# Patient Record
Sex: FEMALE | Race: BLACK OR AFRICAN AMERICAN
[De-identification: names, ages, dates, MRNs, and addresses within clinical notes are randomized per-mention and may not be internally consistent; named-entity substitution may affect disease eponyms.]

---

## 2019-07-03 ENCOUNTER — HOSPITAL ENCOUNTER (EMERGENCY)
Dept: HOSPITAL 62 - ER | Age: 64
Discharge: HOME | End: 2019-07-03
Payer: MEDICARE

## 2019-07-03 VITALS — SYSTOLIC BLOOD PRESSURE: 148 MMHG | DIASTOLIC BLOOD PRESSURE: 73 MMHG

## 2019-07-03 DIAGNOSIS — R06.02: Primary | ICD-10-CM

## 2019-07-03 DIAGNOSIS — I10: ICD-10-CM

## 2019-07-03 DIAGNOSIS — R42: ICD-10-CM

## 2019-07-03 LAB
ADD MANUAL DIFF: NO
ALBUMIN SERPL-MCNC: 4.5 G/DL (ref 3.5–5)
ALP SERPL-CCNC: 59 U/L (ref 38–126)
ALT SERPL-CCNC: 33 U/L (ref 9–52)
ANION GAP SERPL CALC-SCNC: 9 MMOL/L (ref 5–19)
APPEARANCE UR: CLEAR
APTT PPP: (no result) S
AST SERPL-CCNC: 47 U/L (ref 14–36)
BASOPHILS # BLD AUTO: 0 10^3/UL (ref 0–0.2)
BASOPHILS NFR BLD AUTO: 0.6 % (ref 0–2)
BILIRUB DIRECT SERPL-MCNC: 0.3 MG/DL (ref 0–0.4)
BILIRUB SERPL-MCNC: 0.5 MG/DL (ref 0.2–1.3)
BILIRUB UR QL STRIP: NEGATIVE
BUN SERPL-MCNC: 7 MG/DL (ref 7–20)
CALCIUM: 10 MG/DL (ref 8.4–10.2)
CHLORIDE SERPL-SCNC: 106 MMOL/L (ref 98–107)
CO2 SERPL-SCNC: 28 MMOL/L (ref 22–30)
EOSINOPHIL # BLD AUTO: 0 10^3/UL (ref 0–0.6)
EOSINOPHIL NFR BLD AUTO: 0.6 % (ref 0–6)
ERYTHROCYTE [DISTWIDTH] IN BLOOD BY AUTOMATED COUNT: 13.9 % (ref 11.5–14)
GLUCOSE SERPL-MCNC: 151 MG/DL (ref 75–110)
GLUCOSE UR STRIP-MCNC: NEGATIVE MG/DL
HCT VFR BLD CALC: 43.8 % (ref 36–47)
HGB BLD-MCNC: 14.4 G/DL (ref 12–15.5)
KETONES UR STRIP-MCNC: (no result) MG/DL
LYMPHOCYTES # BLD AUTO: 1.6 10^3/UL (ref 0.5–4.7)
LYMPHOCYTES NFR BLD AUTO: 22.3 % (ref 13–45)
MCH RBC QN AUTO: 27.7 PG (ref 27–33.4)
MCHC RBC AUTO-ENTMCNC: 32.8 G/DL (ref 32–36)
MCV RBC AUTO: 85 FL (ref 80–97)
MONOCYTES # BLD AUTO: 0.5 10^3/UL (ref 0.1–1.4)
MONOCYTES NFR BLD AUTO: 6.4 % (ref 3–13)
NEUTROPHILS # BLD AUTO: 5.1 10^3/UL (ref 1.7–8.2)
NEUTS SEG NFR BLD AUTO: 70.1 % (ref 42–78)
NITRITE UR QL STRIP: NEGATIVE
PH UR STRIP: 9 [PH] (ref 5–9)
PLATELET # BLD: 183 10^3/UL (ref 150–450)
POTASSIUM SERPL-SCNC: 4.2 MMOL/L (ref 3.6–5)
PROT SERPL-MCNC: 7.8 G/DL (ref 6.3–8.2)
PROT UR STRIP-MCNC: NEGATIVE MG/DL
RBC # BLD AUTO: 5.19 10^6/UL (ref 3.72–5.28)
SODIUM SERPL-SCNC: 142.9 MMOL/L (ref 137–145)
SP GR UR STRIP: 1.01
TOTAL CELLS COUNTED % (AUTO): 100 %
UROBILINOGEN UR-MCNC: NEGATIVE MG/DL (ref ?–2)
WBC # BLD AUTO: 7.2 10^3/UL (ref 4–10.5)

## 2019-07-03 PROCEDURE — 96361 HYDRATE IV INFUSION ADD-ON: CPT

## 2019-07-03 PROCEDURE — 80053 COMPREHEN METABOLIC PANEL: CPT

## 2019-07-03 PROCEDURE — 82962 GLUCOSE BLOOD TEST: CPT

## 2019-07-03 PROCEDURE — 36415 COLL VENOUS BLD VENIPUNCTURE: CPT

## 2019-07-03 PROCEDURE — 93005 ELECTROCARDIOGRAM TRACING: CPT

## 2019-07-03 PROCEDURE — 78582 LUNG VENTILAT&PERFUS IMAGING: CPT

## 2019-07-03 PROCEDURE — A9540 TC99M MAA: HCPCS

## 2019-07-03 PROCEDURE — 96374 THER/PROPH/DIAG INJ IV PUSH: CPT

## 2019-07-03 PROCEDURE — 70450 CT HEAD/BRAIN W/O DYE: CPT

## 2019-07-03 PROCEDURE — A9567 TECHNETIUM TC-99M AEROSOL: HCPCS

## 2019-07-03 PROCEDURE — 93010 ELECTROCARDIOGRAM REPORT: CPT

## 2019-07-03 PROCEDURE — 85379 FIBRIN DEGRADATION QUANT: CPT

## 2019-07-03 PROCEDURE — 99285 EMERGENCY DEPT VISIT HI MDM: CPT

## 2019-07-03 PROCEDURE — 84484 ASSAY OF TROPONIN QUANT: CPT

## 2019-07-03 PROCEDURE — 71045 X-RAY EXAM CHEST 1 VIEW: CPT

## 2019-07-03 PROCEDURE — 85025 COMPLETE CBC W/AUTO DIFF WBC: CPT

## 2019-07-03 PROCEDURE — 81001 URINALYSIS AUTO W/SCOPE: CPT

## 2019-07-03 NOTE — RADIOLOGY REPORT (SQ)
EXAM DESCRIPTION: 



CT HEAD WITHOUT IV CONTRAST



COMPLETED DATE/TME:  07/03/2019 03:52



CLINICAL HISTORY: 



63 years, Female, ALTERED MENTAL STATUS



COMPARISON:

None.



TECHNIQUE:

Axial CT images of the brain were obtained without contrast.

Sagittal and coronal reformats were performed. [facility] [id number]  Images

stored on PACS.

 

All CT scanners at this facility use dose modulation, iterative

reconstruction, and/or weight based dosing when appropriate to

reduce radiation dose to as low as reasonably achievable (ALARA).





[facility]: Dose Right [facility]: CareDose   [facility]: Dose Right    [facility]:

Teradose 4D    [facility]: Smart Technologies



LIMITATIONS:

None.



FINDINGS:



There is no acute cortical infarct, hemorrhage, mass, edema,

hydrocephalus, or extra-axial fluid collection. The gray-white

matter differentiation is preserved. There are periventricular

and deep white matter chronic microvascular changes. The

paranasal sinuses and mastoid air cells are clear. There is no

acute fracture.





IMPRESSION:



No acute intracranial abnormality.

 

TECHNICAL DOCUMENTATION:



Quality ID # 436: Final reports with documentation of one or more

dose reduction techniques (e.g., Automated exposure control,

adjustment of the mA and/or kV according to patient size, use of

iterative reconstruction technique)



copyright 2011 [employer]- All Rights Reserved

## 2019-07-03 NOTE — RADIOLOGY REPORT (SQ)
EXAM DESCRIPTION:  NM LUNG VENT/PERF SCAN



COMPLETED DATE/TIME:  7/3/2019 10:13 am



REASON FOR STUDY:  dyspnea, elevated d dimer



COMPARISON:  AP chest 7/3/2019



RADIONUCLIDE AND DOSE:  5.4 millicuries TC-99m MAA  Intravenous

31.9 millicuries TC-99m DTPA Inhaled aerosol



TECHNIQUE:  Eight views of the lungs acquired post ventilation of DTPA aerosol.  Eight matching views
 of the lungs acquired following injection of MAA.



LIMITATIONS:  None.



FINDINGS:  VENTILATION: Symmetric and homogeneous distribution of DTPA aerosol during ventilatory pha
se.  No significant areas of photopenia.

PERFUSION: Perfusion images with normal homogenous activity and no wedge-shaped or segmental defects.
  No ventilation-perfusion mismatches.

OTHER: No other significant finding.



IMPRESSION:  NORMAL VENTILATION-PERFUSION LUNG SCAN.  NEGATIVE FOR PULMONARY EMBOLI.



TECHNICAL DOCUMENTATION:  JOB ID:  [id number]

 2011 [employer]- All Rights Reserved



Reading location - IP/workstation name: [email or web address]

## 2019-07-03 NOTE — RADIOLOGY REPORT (SQ)
EXAM DESCRIPTION: 



XR CHEST 1 VIEW



COMPLETED DATE/TME:  07/03/2019 03:50



CLINICAL HISTORY: 



63 years, Female, DYSPNEA



COMPARISON:

None.



NUMBER OF VIEWS:

One



TECHNIQUE:

AP view the chest



LIMITATIONS:

None.



FINDINGS:



The lungs are clear. The heart is normal in size. There is no

pneumothorax or pleural effusion. There is no acute fracture.



IMPRESSION:



No acute cardiopulmonary abnormality.

 



copyright 2011 [employer]- All Rights Reserved 15.4

## 2020-12-02 ENCOUNTER — HOSPITAL ENCOUNTER (OUTPATIENT)
Dept: HOSPITAL 62 - RDC | Age: 65
End: 2020-12-02
Attending: NURSE PRACTITIONER
Payer: MEDICARE

## 2020-12-02 VITALS — SYSTOLIC BLOOD PRESSURE: 165 MMHG | DIASTOLIC BLOOD PRESSURE: 80 MMHG

## 2020-12-02 DIAGNOSIS — Z20.828: Primary | ICD-10-CM

## 2020-12-02 DIAGNOSIS — J45.909: ICD-10-CM

## 2020-12-02 DIAGNOSIS — Z91.041: ICD-10-CM

## 2020-12-02 DIAGNOSIS — I10: ICD-10-CM

## 2020-12-02 PROCEDURE — 99211 OFF/OP EST MAY X REQ PHY/QHP: CPT

## 2020-12-02 PROCEDURE — C9803 HOPD COVID-19 SPEC COLLECT: HCPCS

## 2020-12-02 PROCEDURE — 87635 SARS-COV-2 COVID-19 AMP PRB: CPT

## 2020-12-02 PROCEDURE — 99201: CPT

## 2020-12-02 NOTE — ER RDC ASSESSMENT REPORT
Intake





- In the Last 14 days


Have you traveled outside [place]?: No


Have you been in close contact with someone CONFIRMED: Yes


Worked in Healthcare?: No





- Symptoms


Subjective Fever(Felt feverish): No


Chills: No


Muscule Aches: No


Runny Nose: No


Sore Throat: No


Cough (New or worsening chronic cough): No


Shortness of breath: No


Nausea or Vomiting: No


Headache: No


Abdominal Pain: No


Diarrhea(3 or more loose stools in last 24 hours): No





- Do you have any of the following


Chronic lung disease: Asthma or emphysema or COPD: Yes


Chronic Lung Disease Comment: 





History of asthma


Cystic Fibrosis: No


Diabetes: No


High Blood Pressure: No


Cardiovascular Disease: No


Chronic Kidney Disease: No


Chronic Liver Disease: No


Chronic blood disorder like Sickle Cell Disease: No


Weak immune system due to disease or medication: No


Neurologic condition that limits movement: No


Developmental delay - Moderate to Severe: No


Recent (within past 2 weeks) or current Pregnancy: No


Morbid Obesity (>100 pounds over ideal weight): No


Obesity Comment: 





Weight 183 pounds height 5 feet 4 inches





- Objective


Temperature: 98.6 F


Pulse Rate: 85


Respiratory Rate: 16


Blood Pressure: 165/80


O2 Sat by Pulse Oximetry: 95


Objective: 


Given above, testing performed: 
































If Testing Performed:


Test Specimen Type Sent to











General





- General


Information source: Patient


Notes: 





Patient here at [facility] for Covid testing.  Patient states has exposure to daughter 

who tested +2 days ago.  Was in contact with her last week daughter dropped off 

package patient wore mask.  Patient denies any symptoms.  Patient's PCP is Dr. Hess and recommended testing for Covid.





- Related Data


Allergies/Adverse Reactions: 


                                        





Iodinated Contrast Media [Iodinated Contrast- Oral and IV Dye] Allergy (Verified

07/03/19 07:41)


   











Past Medical History





- General


Information source: Patient





- Social History


Smoking Status: Never Smoker


Family History: CAD





- Past Medical History


Cardiac Medical History: Reports: Hx Hypertension


Renal/ Medical History: Denies: Hx Peritoneal Dialysis


Past Surgical History: Reports: Hx Oral Surgery





Physical Exam





- General


General appearance: Appears well, Alert


In distress: None


Notes: 





PHYSICAL EXAMINATION: 


GENERAL: Well-appearing and in no acute distress. 


HEAD: Atraumatic, normocephalic. 


EYES: sclera anicteric, conjunctiva are normal. 


ENT: nares patent. Moist mucous membranes. 


NECK: Normal range of motion, supple without lymphadenopathy 


LUNGS: CTAB and equal. No wheezes rales or rhonchi.  Respirations even and u

nlabored lung sounds clear.


HEART: Regular rate and rhythm without murmurs 


ABDOMEN: Soft, nontender, normal bowel sounds, no guarding. 


EXTREMITIES: Normal range of motion, no pitting edema. No cyanosis. 


NEUROLOGICAL: Cranial nerves grossly intact. Normal speech. Normal gait.


PSYCH: Normal mood, normal affect. 


SKIN: Warm, Dry, normal turgor, no rashes or lesions noted








Diagnostic Results


Laboratory Results: 


Pending Covid testing results.  Patient provided instructions regarding Covid to

include: As a person under investigation for Covid 19, the [place] 

department of Health and Human Services, division of public health advises you 

to adhere to the following guidance until your test results are reported to you.

 If your test result is positive, you will receive additional information from 

your provider and your local health department at that time.


 


Remain at home until you are cleared by the health provider or public health 

authorities.


 


Keep a log of visitors to your home, notify any visitors to your home of your 

isolation status.


 


If you plan to move to a new address or leave the [facility], notify the local 

health department in your County.


 


Call your doctor or seek care if you have an urgent medical need.  Before 

seeking medical care, call ahead to get instructions from the provider before 

arriving at the medical office clinic or hospital.  Notify them that you are 

being tested for the virus that causes Covid 19 so that arrangements can be 

made, as necessary, to prevent transmission to others in the healthcare setting.

 Next, notify the local health department in your county.


 


If a medical emergency arises and you need to call 911, inform the first 

responders that you are being tested for the virus that causes Covid 19.  Next, 

notify the local health department in your county.





Patient Education/Counseling


Counseling/Education: 





Patient presents with upper respiratory symptoms worrisome for possible Covid 

19.  Patient does not have emergency worring symptoms such as difficulty 

breathing, shortness of breath, chest pain, pressure, confusion or cyanosis.  

Patient appears suitable for discharge.  Patient instructed to follow-up with 

PCP Dr. Hess.  Patient's vital signs are stable and patient is nontoxic in 

appearance.  Good return precautions have been discussed with patient, patient 

verbalized understanding and is agreeable with discharge plan of care at this 

time.





RDC Discharge





- Discharge


Clinical Impression: 


 Encounter for screening laboratory testing for COVID-19 virus in asymptomatic 

patient





Condition: Stable


Disposition: Home; Selfcare

## 2023-01-31 NOTE — ER DOCUMENT REPORT
Advised regular use of [place] grid. ED General





<NIEVES PASTORN C - Last Filed: 19 05:23>





<CELESTE PASTOR A - Last Filed: 19 10:54>





- General


Chief Complaint: Shortness Of Breath


Stated Complaint: DIFFICULTY BREATHING


Time Seen by Provider: 19 03:49


Notes: 





Patient is a 63-year-old female with history of hypertension that presents to 

the emergency department for chief complaint of shortness of breath.  Patient 

apparently woke up feeling short of breath, she did have outpatient dental 

surgery earlier today with anesthesia, was discharged home with oxycodone, 

ibuprofen and penicillin.  She has been having episodes where she is feeling 

dizzy, and having brief episodes of feeling short of breath when she is 

breathing heavy and that is of brought her to the emergency department.  She 

denies having any chest pain associated with this, denies fevers, chills, night 

sweats, nausea, vomiting, abdominal pain, dysuria hematuria.  She states she had

2 molars extracted in her upper right mouth.





Past Medical History: Hypertension


Past Surgical History: 


Social History: Denies tobacco, alcohol or drug use.


Family History: Reviewed and noncontributory for presenting illness


Allergies: Reviewed, see documented allergy list. 





REVIEW OF SYSTEMS:


Other than noted above, the 12 point review of systems was reviewed with the 

patient and were negative, all pertinent findings are included in the HPI.





PHYSICAL EXAMINATION:





Vital signs reviewed, nursing noted reviewed. 





GENERAL: Patient appears, anxious on exam with somewhat of a bizarre affect





HEAD: Atraumatic, normocephalic.





EYES: Eyes appear normal, extraocular movements intact, sclera anicteric, 

conjunctiva are normal.  PERRLA, but pupils are constricted





ENT: nares patent, oropharynx clear without exudates.  Moist mucous membranes.  

The patient's upper right posterior molars, have been extracted, and no active 

bleeding, appear to be healing well from recent extraction.





NECK: Normal range of motion, supple without lymphadenopathy





LUNGS: Rapid shallow breathing, however lungs are clear to auscultation 

bilaterally.





HEART: Heart rate mildly bradycardic, regular rhythm, no audible murmur.





ABDOMEN: Soft, nontender, normoactive bowel sounds.  No rebound, guarding, or 

rigidity. No masses appreciated.





EXTREMITIES: Nontender, good range of motion, no pitting or edema.  





NEUROLOGICAL: No focal neurological deficits. Moves all extremities 

spontaneously Motor and sensory grossly intact on exam.





PSYCH: Bizarre affect, appears mildly anxious





SKIN: Warm, Dry, normal turgor, no rashes or lesions noted on exposed skin


 (SILVANO PASTOR)





- Related Data


Allergies/Adverse Reactions: 


                                        





Iodinated Contrast- Oral and IV Dye Allergy (Verified 19 07:41)


   











Past Medical History





- Social History


Smoking Status: Never Smoker


Family History: CAD





<CELESTE PASTOR - Last Filed: 19 10:54>





- Vital signs


Vitals: 





                                        











Resp


 


 13 


 


 19 03:42














Course





- Laboratory


Result Diagrams: 


                                 19 03:45





                                 19 03:45





<SILVANO PASTOR - Last Filed: 19 05:23>





- Laboratory


Result Diagrams: 


                                 19 03:45





                                 19 03:45





<CELESTE PASTOR - Last Filed: 19 10:54>





- Re-evaluation


Re-evalutation: 





Patient seen and examined vital signs reviewed. 





Laboratory data and/or imaging were ordered as appropriate for the patient's 

presenting symptoms and complaint, with consideration of any critical or life 

threatening conditions that may be associated with their obtained history and 

exam as noted above.





Patient was treated with IV Narcan 0.4 mg, given she is recently started on 

oxycodone, she had a total of 3 tablets this evening, patient did have an i

ncrease in her heart rate, but was still having intermittently rapid breathing.





Results were reviewed when available and demonstrated unremarkable work-up, 

negative CT of the head, negative CBC, unremarkable CMP, negative troponin, EKG 

demonstrated sinus rhythm without concerning for ischemia, chest x-ray negative 

as well.  I did order a d-dimer, that was slightly elevated, patient has an 

allergy to IVP dye, therefore VQ scan was ordered, I do have a low suspicion for

PE, given the patient just had surgery, but given the patient's having 

intermittent rapid breathing and dyspnea, did want to rule this out and 

therefore will pursue the VQ scan.





The patient was re-evaluated and was still appearing rather anxious, I advised 

her to to try to control her breathing, by breathing through nose and out her 

mouth, I did offer to give her a low-dose of IV Ativan, but she wanted to try to

slow her breathing on her own.  She did appear anxious, and I think she may be 

having a panic attack versus side effect from anesthesia/oxycodone.





At this point, I will sign out the VQ scan to my colleague, Dr. CATRACHITA Pastor, to 

follow-up on that and to review the results with the patient.





*Note is created using voice recognition software and may contain spelling, 

syntax or grammatical errors.


 (SILVANO PASTOR)





19 10:45


Patient seen and evaluated by myself.  She has normal respirations.  She is in 

no acute distress.  She states she is feeling much better.  Her VQ scan is 

normal.  At this point I agree that her symptoms may have been a side effect 

from the pain medication and anesthesia.  She was advised to take Tylenol and 

ibuprofen instead of the Percocet.  Currently she states she does not have any 

postoperative pain.  She was advised to follow with a primary care provider in 

the area.  She will return for new or worsening symptoms.  She is stable and in 

agreement with plan of care at discharge. (CELESTE PASTOR)





- Vital Signs


Vital signs: 





                                        











Temp Pulse Resp BP Pulse Ox


 


 97.4 F      12   125/71   97 


 


 19 03:45     19 09:17  19 09:17  19 09:17














- Laboratory


Laboratory results interpreted by me: 





                                        











  19





  03:42 03:45 03:45


 


D-Dimer    0.59 H


 


Glucose   151 H 


 


POC Glucose  145 H  


 


AST   47 H 


 


Urine Ketones   














  19





  06:38


 


D-Dimer 


 


Glucose 


 


POC Glucose 


 


AST 


 


Urine Ketones  TRACE H














Discharge





<SILVANO PASTOR - Last Filed: 19 05:23>





<CELESTE PASTOR - Last Filed: 19 10:54>





- Discharge


Clinical Impression: 


 Shortness of breath





Condition: Stable


Disposition: HOME, SELF-CARE


Instructions:  Dyspnea, Nonspecific ([facility]), Anxiety ([facility])


Additional Instructions: 


Please return to the emergency department if you have any worsening, or concern 

of your symptoms.





Please return to the emergency department if you develop chest pain, difficulty 

breathing, severe abdominal pain, or ongoing vomiting.





Please follow-up with your primary care physician in 2-3 days and any other 

recommended physicians.





If prescribed, take all medications as directed. 





If you have any questions or concerns do not hesitate to return the emergency 

department for evaluation.











Referrals: 


[facility] [Provider Group] - Follow up as needed